# Patient Record
Sex: FEMALE | Race: WHITE | NOT HISPANIC OR LATINO | Employment: OTHER | URBAN - METROPOLITAN AREA
[De-identification: names, ages, dates, MRNs, and addresses within clinical notes are randomized per-mention and may not be internally consistent; named-entity substitution may affect disease eponyms.]

---

## 2023-04-07 LAB
ALANINE AMINOTRANSFERASE (SGPT) (U/L) IN SER/PLAS: 13 U/L (ref 7–45)
ALBUMIN (G/DL) IN SER/PLAS: 4.1 G/DL (ref 3.4–5)
ALKALINE PHOSPHATASE (U/L) IN SER/PLAS: 80 U/L (ref 33–136)
ANION GAP IN SER/PLAS: 11 MMOL/L (ref 10–20)
ASPARTATE AMINOTRANSFERASE (SGOT) (U/L) IN SER/PLAS: 14 U/L (ref 9–39)
BASOPHILS (10*3/UL) IN BLOOD BY AUTOMATED COUNT: 0.07 X10E9/L (ref 0–0.1)
BASOPHILS/100 LEUKOCYTES IN BLOOD BY AUTOMATED COUNT: 1 % (ref 0–2)
BILIRUBIN TOTAL (MG/DL) IN SER/PLAS: 0.5 MG/DL (ref 0–1.2)
CALCIDIOL (25 OH VITAMIN D3) (NG/ML) IN SER/PLAS: 47 NG/ML
CALCIUM (MG/DL) IN SER/PLAS: 10 MG/DL (ref 8.6–10.6)
CARBON DIOXIDE, TOTAL (MMOL/L) IN SER/PLAS: 29 MMOL/L (ref 21–32)
CHLORIDE (MMOL/L) IN SER/PLAS: 106 MMOL/L (ref 98–107)
CHOLESTEROL (MG/DL) IN SER/PLAS: 194 MG/DL (ref 0–199)
CHOLESTEROL IN HDL (MG/DL) IN SER/PLAS: 67.1 MG/DL
CHOLESTEROL/HDL RATIO: 2.9
CREATININE (MG/DL) IN SER/PLAS: 1.25 MG/DL (ref 0.5–1.05)
EOSINOPHILS (10*3/UL) IN BLOOD BY AUTOMATED COUNT: 0.42 X10E9/L (ref 0–0.4)
EOSINOPHILS/100 LEUKOCYTES IN BLOOD BY AUTOMATED COUNT: 5.8 % (ref 0–6)
ERYTHROCYTE DISTRIBUTION WIDTH (RATIO) BY AUTOMATED COUNT: 13.2 % (ref 11.5–14.5)
ERYTHROCYTE MEAN CORPUSCULAR HEMOGLOBIN CONCENTRATION (G/DL) BY AUTOMATED: 31.3 G/DL (ref 32–36)
ERYTHROCYTE MEAN CORPUSCULAR VOLUME (FL) BY AUTOMATED COUNT: 91 FL (ref 80–100)
ERYTHROCYTES (10*6/UL) IN BLOOD BY AUTOMATED COUNT: 4.56 X10E12/L (ref 4–5.2)
GFR FEMALE: 43 ML/MIN/1.73M2
GLUCOSE (MG/DL) IN SER/PLAS: 92 MG/DL (ref 74–99)
HEMATOCRIT (%) IN BLOOD BY AUTOMATED COUNT: 41.6 % (ref 36–46)
HEMOGLOBIN (G/DL) IN BLOOD: 13 G/DL (ref 12–16)
IMMATURE GRANULOCYTES/100 LEUKOCYTES IN BLOOD BY AUTOMATED COUNT: 0.3 % (ref 0–0.9)
LDL: 110 MG/DL (ref 0–99)
LEUKOCYTES (10*3/UL) IN BLOOD BY AUTOMATED COUNT: 7.2 X10E9/L (ref 4.4–11.3)
LYMPHOCYTES (10*3/UL) IN BLOOD BY AUTOMATED COUNT: 2.23 X10E9/L (ref 0.8–3)
LYMPHOCYTES/100 LEUKOCYTES IN BLOOD BY AUTOMATED COUNT: 30.8 % (ref 13–44)
MONOCYTES (10*3/UL) IN BLOOD BY AUTOMATED COUNT: 0.67 X10E9/L (ref 0.05–0.8)
MONOCYTES/100 LEUKOCYTES IN BLOOD BY AUTOMATED COUNT: 9.3 % (ref 2–10)
NEUTROPHILS (10*3/UL) IN BLOOD BY AUTOMATED COUNT: 3.82 X10E9/L (ref 1.6–5.5)
NEUTROPHILS/100 LEUKOCYTES IN BLOOD BY AUTOMATED COUNT: 52.8 % (ref 40–80)
NRBC (PER 100 WBCS) BY AUTOMATED COUNT: 0 /100 WBC (ref 0–0)
PLATELETS (10*3/UL) IN BLOOD AUTOMATED COUNT: 283 X10E9/L (ref 150–450)
POTASSIUM (MMOL/L) IN SER/PLAS: 4.4 MMOL/L (ref 3.5–5.3)
PROTEIN TOTAL: 6.6 G/DL (ref 6.4–8.2)
SODIUM (MMOL/L) IN SER/PLAS: 142 MMOL/L (ref 136–145)
TRIGLYCERIDE (MG/DL) IN SER/PLAS: 86 MG/DL (ref 0–149)
UREA NITROGEN (MG/DL) IN SER/PLAS: 21 MG/DL (ref 6–23)
VLDL: 17 MG/DL (ref 0–40)

## 2023-09-05 ENCOUNTER — OFFICE VISIT (OUTPATIENT)
Dept: PRIMARY CARE | Facility: CLINIC | Age: 83
End: 2023-09-05
Payer: MEDICARE

## 2023-09-05 VITALS
WEIGHT: 146.8 LBS | TEMPERATURE: 97 F | DIASTOLIC BLOOD PRESSURE: 72 MMHG | OXYGEN SATURATION: 93 % | HEART RATE: 78 BPM | BODY MASS INDEX: 27.02 KG/M2 | SYSTOLIC BLOOD PRESSURE: 112 MMHG | HEIGHT: 62 IN

## 2023-09-05 DIAGNOSIS — J40 BRONCHITIS: Primary | ICD-10-CM

## 2023-09-05 PROCEDURE — 1159F MED LIST DOCD IN RCRD: CPT | Performed by: INTERNAL MEDICINE

## 2023-09-05 PROCEDURE — 99213 OFFICE O/P EST LOW 20 MIN: CPT | Performed by: INTERNAL MEDICINE

## 2023-09-05 PROCEDURE — 1157F ADVNC CARE PLAN IN RCRD: CPT | Performed by: INTERNAL MEDICINE

## 2023-09-05 PROCEDURE — 1036F TOBACCO NON-USER: CPT | Performed by: INTERNAL MEDICINE

## 2023-09-05 PROCEDURE — 1160F RVW MEDS BY RX/DR IN RCRD: CPT | Performed by: INTERNAL MEDICINE

## 2023-09-05 RX ORDER — AZITHROMYCIN 250 MG/1
TABLET, FILM COATED ORAL
Qty: 6 TABLET | Refills: 0 | Status: SHIPPED | OUTPATIENT
Start: 2023-09-05 | End: 2023-09-05 | Stop reason: SDUPTHER

## 2023-09-05 RX ORDER — AZITHROMYCIN 250 MG/1
TABLET, FILM COATED ORAL
Qty: 6 TABLET | Refills: 0 | Status: SHIPPED | OUTPATIENT
Start: 2023-09-05 | End: 2023-09-10

## 2023-09-05 ASSESSMENT — ENCOUNTER SYMPTOMS
UNEXPECTED WEIGHT CHANGE: 0
CHILLS: 0
PALPITATIONS: 0
FEVER: 0

## 2023-09-05 NOTE — PROGRESS NOTES
"Subjective   Patient ID: Meri Seth is a 83 y.o. female who presents for Follow-up (Yellow phlegm cough wont go away).    HPI   Patient is presenting with cold symptoms that started 2 weeks sgo ago.  Associated symptoms include  fatigue, postnasal drip,  sore throat, cough.  No chest pain or shortness of breath.  No recent travel.  No exposure to strep or pneumonia or mono.  No associated symptoms of nausea, vomiting, diarrhea.  No skin rash   Tried over-the-counter medications without improvement.  Review of Systems   Constitutional:  Negative for chills, fever and unexpected weight change.   HENT:  Negative for nosebleeds.    Cardiovascular:  Negative for chest pain and palpitations.       Objective   /72   Pulse 78   Temp 36.1 °C (97 °F)   Ht 1.575 m (5' 2\")   Wt 66.6 kg (146 lb 12.8 oz)   SpO2 93%   BMI 26.85 kg/m²     Physical Exam  In general, patient is not in acute distress.  Appears fatigued.  HEENT: No pallor or icterus.  No conjunctival injection, has nosinus tenderness tympanic membrane normal bilaterally.  Throat erythema  seen, no exudate, no enlargement of tonsils.  Neck: No stiffness, no lymphadenopathy.  Chest: Clear, bilateral good air entry.  CVS: S1 and S2 regular, no murmur, no edema.  Abdomen: Soft, nontender, no hepatosplenomegaly.  Skin: No rash  Assessment/Plan   Problem List Items Addressed This Visit       Bronchitis - Primary     Will give Zithromax for bronchitis         Relevant Medications    azithromycin (Zithromax) 250 mg tablet     Call if persisting.     "

## 2023-09-05 NOTE — PROGRESS NOTES
"Subjective   Patient ID: Meri Seth is a 83 y.o. female who presents for Follow-up (Yellow phlegm cough wont go away).    HPI     Review of Systems    Objective   /72   Pulse 78   Temp 36.1 °C (97 °F)   Ht 1.575 m (5' 2\")   Wt 66.6 kg (146 lb 12.8 oz)   SpO2 93%   BMI 26.85 kg/m²     Physical Exam    Assessment/Plan   {Assess/PlanSmartLinks:77333}       "

## 2024-04-17 ENCOUNTER — OFFICE VISIT (OUTPATIENT)
Dept: PRIMARY CARE | Facility: CLINIC | Age: 84
End: 2024-04-17
Payer: MEDICARE

## 2024-04-17 VITALS
SYSTOLIC BLOOD PRESSURE: 140 MMHG | DIASTOLIC BLOOD PRESSURE: 80 MMHG | OXYGEN SATURATION: 95 % | HEART RATE: 71 BPM | WEIGHT: 154 LBS | HEIGHT: 63 IN | BODY MASS INDEX: 27.29 KG/M2 | TEMPERATURE: 98.2 F

## 2024-04-17 DIAGNOSIS — R29.6 RECURRENT FALLS: ICD-10-CM

## 2024-04-17 DIAGNOSIS — H91.93 BILATERAL HEARING LOSS, UNSPECIFIED HEARING LOSS TYPE: ICD-10-CM

## 2024-04-17 DIAGNOSIS — E78.2 MIXED HYPERLIPIDEMIA: ICD-10-CM

## 2024-04-17 DIAGNOSIS — K21.9 GASTROESOPHAGEAL REFLUX DISEASE, UNSPECIFIED WHETHER ESOPHAGITIS PRESENT: ICD-10-CM

## 2024-04-17 DIAGNOSIS — Z23 NEED FOR VACCINATION: ICD-10-CM

## 2024-04-17 DIAGNOSIS — Z00.00 ROUTINE GENERAL MEDICAL EXAMINATION AT HEALTH CARE FACILITY: Primary | ICD-10-CM

## 2024-04-17 DIAGNOSIS — N18.31 STAGE 3A CHRONIC KIDNEY DISEASE (MULTI): ICD-10-CM

## 2024-04-17 PROBLEM — J40 BRONCHITIS: Status: RESOLVED | Noted: 2023-09-05 | Resolved: 2024-04-17

## 2024-04-17 PROBLEM — E78.5 HYPERLIPIDEMIA: Status: ACTIVE | Noted: 2024-04-17

## 2024-04-17 PROCEDURE — 1160F RVW MEDS BY RX/DR IN RCRD: CPT | Performed by: INTERNAL MEDICINE

## 2024-04-17 PROCEDURE — 1159F MED LIST DOCD IN RCRD: CPT | Performed by: INTERNAL MEDICINE

## 2024-04-17 PROCEDURE — G0439 PPPS, SUBSEQ VISIT: HCPCS | Performed by: INTERNAL MEDICINE

## 2024-04-17 PROCEDURE — 1123F ACP DISCUSS/DSCN MKR DOCD: CPT | Performed by: INTERNAL MEDICINE

## 2024-04-17 PROCEDURE — 90677 PCV20 VACCINE IM: CPT | Performed by: INTERNAL MEDICINE

## 2024-04-17 PROCEDURE — 1170F FXNL STATUS ASSESSED: CPT | Performed by: INTERNAL MEDICINE

## 2024-04-17 PROCEDURE — 1036F TOBACCO NON-USER: CPT | Performed by: INTERNAL MEDICINE

## 2024-04-17 PROCEDURE — 1157F ADVNC CARE PLAN IN RCRD: CPT | Performed by: INTERNAL MEDICINE

## 2024-04-17 PROCEDURE — 1158F ADVNC CARE PLAN TLK DOCD: CPT | Performed by: INTERNAL MEDICINE

## 2024-04-17 PROCEDURE — G0009 ADMIN PNEUMOCOCCAL VACCINE: HCPCS | Performed by: INTERNAL MEDICINE

## 2024-04-17 PROCEDURE — 99213 OFFICE O/P EST LOW 20 MIN: CPT | Performed by: INTERNAL MEDICINE

## 2024-04-17 RX ORDER — PANTOPRAZOLE SODIUM 40 MG/1
40 TABLET, DELAYED RELEASE ORAL DAILY
Qty: 30 TABLET | Refills: 1 | Status: SHIPPED | OUTPATIENT
Start: 2024-04-17 | End: 2024-05-21 | Stop reason: ALTCHOICE

## 2024-04-17 RX ORDER — LOVASTATIN 20 MG/1
TABLET ORAL
COMMUNITY
Start: 2023-12-28

## 2024-04-17 ASSESSMENT — SOCIAL DETERMINANTS OF HEALTH (SDOH): HOW HARD IS IT FOR YOU TO PAY FOR THE VERY BASICS LIKE FOOD, HOUSING, MEDICAL CARE, AND HEATING?: NOT HARD AT ALL

## 2024-04-17 ASSESSMENT — ENCOUNTER SYMPTOMS
LOSS OF SENSATION IN FEET: 0
OCCASIONAL FEELINGS OF UNSTEADINESS: 1
DEPRESSION: 0

## 2024-04-17 ASSESSMENT — ACTIVITIES OF DAILY LIVING (ADL)
MANAGING_FINANCES: INDEPENDENT
BATHING: INDEPENDENT
TAKING_MEDICATION: INDEPENDENT
DOING_HOUSEWORK: INDEPENDENT
TAKING_MEDICATION: INDEPENDENT
DRESSING: INDEPENDENT
GROCERY_SHOPPING: INDEPENDENT
DOING_HOUSEWORK: INDEPENDENT
BATHING: INDEPENDENT
MANAGING_FINANCES: INDEPENDENT
GROCERY_SHOPPING: INDEPENDENT

## 2024-04-17 ASSESSMENT — PATIENT HEALTH QUESTIONNAIRE - PHQ9
1. LITTLE INTEREST OR PLEASURE IN DOING THINGS: NOT AT ALL
SUM OF ALL RESPONSES TO PHQ9 QUESTIONS 1 & 2: 0
1. LITTLE INTEREST OR PLEASURE IN DOING THINGS: NOT AT ALL
2. FEELING DOWN, DEPRESSED OR HOPELESS: NOT AT ALL
2. FEELING DOWN, DEPRESSED OR HOPELESS: NOT AT ALL
SUM OF ALL RESPONSES TO PHQ9 QUESTIONS 1 AND 2: 0

## 2024-04-17 NOTE — PROGRESS NOTES
"Subjective   Reason for Visit: Meri Seth is an 83 y.o. female here for a Medicare Wellness visit.     Past Medical, Surgical, and Family History reviewed and updated in chart.    Reviewed all medications by prescribing practitioner or clinical pharmacist (such as prescriptions, OTCs, herbal therapies and supplements) and documented in the medical record.    HPI  Has had 3 falls in December.  First time felt lightheaded and off balance and follow-up.  No major injury    Fell on step and hit left buttock.  Had a bruise which got better.  No fracture  Once fell hiking.  Does have some unsteady feeling.  On and off feels lightheaded  Does not use cane.  Hold onto furniture sometimes to walk  No vision problem  Hearing is reduced  Continues to have some hoarseness.  Does have bloating  Has intermittent cough and sneezing    Does not check BP at home  Diet has a lot of cheese.  Watches salt intake    Taking statin without side effects  Takes calcium and vitamin D  Patient Care Team:  Sneha hWitten MD as PCP - General  Sneha Whitten MD as PCP - [employer] PCP     Review of Systems  No fever chills or night sweats  No nausea vomiting constipation or diarrhea  No chest pain or shortness of breath or palpitation  No PND orthopnea  No rectal bleed  No anxiety or depression  Objective   Vitals:  /80   Pulse 71   Temp 36.8 °C (98.2 °F)   Ht 1.588 m (5' 2.5\")   Wt 69.9 kg (154 lb)   SpO2 95%   BMI 27.72 kg/m²       Physical Exam  Not in acute distress  Able to get up from chair without holding.  Pupils PERRLA.  Tympanic membrane normal bilateral.  Slight cerumen seen  Neck supple no lymphadenopathy JVD or goiter  Chest is clear to auscultation  CVS: S1-S2 regular in rate and rhythm no murmur no edema  Abdomen: Soft has gas distention.  Mild epigastric discomfort on palpation.  Bowel sounds heard.  No mass felt  Neuroexam no focal findings  Gait is slow and cautious.  Strength " normal  Assessment/Plan   Problem List Items Addressed This Visit       Stage 3a chronic kidney disease (Multi)    Hyperlipidemia    Relevant Orders    CBC and Auto Differential    Comprehensive Metabolic Panel    Lipid Panel    Recurrent falls    Relevant Orders    Referral to Falls Clinic    Referral to Physical Therapy    Gastroesophageal reflux disease    Relevant Medications    pantoprazole (ProtoNix) 40 mg EC tablet    Routine general medical examination at health care facility - Primary     Other Visit Diagnoses       Need for vaccination        Relevant Orders    Pneumococcal conjugate vaccine 20-valent IM (Completed)    Bilateral hearing loss, unspecified hearing loss type        Relevant Orders    Referral to Audiology          Wellness exam and counseling completed  She declines further mammogram  Has had multiple falls.  Has slight unsteadiness.  Will refer to falls clinic  Audiology referral given  Has abdominal gas and cough.  Will give trial of PPI.  Avoid cheese and breads for some time  Can take magnesium 200 mg daily  Fall prevention discussed  Check renal function  Check lipids and liver enzymes  Follow-up in 4 weeks  Blood pressure slightly high.  Will reassess  Get Tdap and Shingrix from pharmacy   Patient was identified as a fall risk. Risk prevention instructions provided.

## 2024-04-17 NOTE — PATIENT INSTRUCTIONS
It was a pleasure meeting you today.   You can see your health information, review clinical summaries from office visits & test results online when you follow your health with MY  Chart, a personal health record. To sign up go to [email or web address]. If you need assistance with signing up or trouble getting into your account call [facility] Patient Line 24/7 at [phone].   If there are any  follow-up questions you can always call the office at [phone].  Thank you for choosing [facility] for your healthcare needs.  Make diet changes as discussed.  Avoid breads and cheeses and fermented foods for a while  Take magnesium citrate or glycinate 200 mg daily  Follow-up in 4 weeks.      Ways to Help Prevent Falls at Home    Quick Tips   ? Ask for help if you need it. Most people want to help!   ? Get up slowly after sitting or laying down   ? Wear a medical alert device or keep cell phone in your pocket   ? Use night lights, especially areas near a bathroom   ? Keep the items you use often within reach on a small stool or end table   ? Use an assistive device such as walker or cane, as directed by provider/physical therapy   ? Use a non-slip mat and grab bars in your bathroom. Look for home health sections for best options     Other Areas to Focus On   ? Exercise and nutrition: Regular exercise or taking a falls prevention class are great ways improve strength and balance. Don’t forget to stay hydrated and bring a snack!   ? Medicine side effects: Some medicines can make you sleepy or dizzy, which could cause a fall. Ask your healthcare provider about the side effects your medicines could cause. Be sure to let them know if you take any vitamins or supplements as well.   ? Tripping hazards: Remove items you could trip on, such as loose mats, rugs, cords, and clutter. Wear closed toe shoes with rubber soles.   ? Health and wellness: Get regular checkups with your healthcare provider, plus routine  vision and hearing screenings. Talk with your healthcare provider about:   o Your medicines and the possible side effects - bring them in a bag if that is easier!   o Problems with balance or feeling dizzy   o Ways to promote bone health, such as Vitamin D and calcium supplements   o Questions or concerns about falling     *Ask your healthcare team if you have questions     [facility], 2022

## 2024-04-26 ENCOUNTER — LAB (OUTPATIENT)
Dept: LAB | Facility: LAB | Age: 84
End: 2024-04-26
Payer: MEDICARE

## 2024-04-26 DIAGNOSIS — N18.32 STAGE 3B CHRONIC KIDNEY DISEASE (MULTI): Primary | ICD-10-CM

## 2024-04-26 DIAGNOSIS — E78.2 MIXED HYPERLIPIDEMIA: ICD-10-CM

## 2024-04-26 LAB
ALBUMIN SERPL BCP-MCNC: 4.1 G/DL (ref 3.4–5)
ALP SERPL-CCNC: 80 U/L (ref 33–136)
ALT SERPL W P-5'-P-CCNC: 13 U/L (ref 7–45)
ANION GAP SERPL CALC-SCNC: 11 MMOL/L (ref 10–20)
AST SERPL W P-5'-P-CCNC: 17 U/L (ref 9–39)
BASOPHILS # BLD AUTO: 0.06 X10*3/UL (ref 0–0.1)
BASOPHILS NFR BLD AUTO: 0.8 %
BILIRUB SERPL-MCNC: 0.4 MG/DL (ref 0–1.2)
BUN SERPL-MCNC: 21 MG/DL (ref 6–23)
CALCIUM SERPL-MCNC: 9.6 MG/DL (ref 8.6–10.6)
CHLORIDE SERPL-SCNC: 106 MMOL/L (ref 98–107)
CHOLEST SERPL-MCNC: 182 MG/DL (ref 0–199)
CHOLESTEROL/HDL RATIO: 2.5
CO2 SERPL-SCNC: 30 MMOL/L (ref 21–32)
CREAT SERPL-MCNC: 1.35 MG/DL (ref 0.5–1.05)
EGFRCR SERPLBLD CKD-EPI 2021: 39 ML/MIN/1.73M*2
EOSINOPHIL # BLD AUTO: 0.36 X10*3/UL (ref 0–0.4)
EOSINOPHIL NFR BLD AUTO: 4.7 %
ERYTHROCYTE [DISTWIDTH] IN BLOOD BY AUTOMATED COUNT: 13.2 % (ref 11.5–14.5)
GLUCOSE SERPL-MCNC: 84 MG/DL (ref 74–99)
HCT VFR BLD AUTO: 39.5 % (ref 36–46)
HDLC SERPL-MCNC: 72.4 MG/DL
HGB BLD-MCNC: 12.7 G/DL (ref 12–16)
IMM GRANULOCYTES # BLD AUTO: 0.01 X10*3/UL (ref 0–0.5)
IMM GRANULOCYTES NFR BLD AUTO: 0.1 % (ref 0–0.9)
LDLC SERPL CALC-MCNC: 96 MG/DL
LYMPHOCYTES # BLD AUTO: 2.36 X10*3/UL (ref 0.8–3)
LYMPHOCYTES NFR BLD AUTO: 30.9 %
MCH RBC QN AUTO: 29.4 PG (ref 26–34)
MCHC RBC AUTO-ENTMCNC: 32.2 G/DL (ref 32–36)
MCV RBC AUTO: 91 FL (ref 80–100)
MONOCYTES # BLD AUTO: 0.87 X10*3/UL (ref 0.05–0.8)
MONOCYTES NFR BLD AUTO: 11.4 %
NEUTROPHILS # BLD AUTO: 3.97 X10*3/UL (ref 1.6–5.5)
NEUTROPHILS NFR BLD AUTO: 52.1 %
NON HDL CHOLESTEROL: 110 MG/DL (ref 0–149)
NRBC BLD-RTO: 0 /100 WBCS (ref 0–0)
PLATELET # BLD AUTO: 249 X10*3/UL (ref 150–450)
POTASSIUM SERPL-SCNC: 4.2 MMOL/L (ref 3.5–5.3)
PROT SERPL-MCNC: 6.5 G/DL (ref 6.4–8.2)
RBC # BLD AUTO: 4.32 X10*6/UL (ref 4–5.2)
SODIUM SERPL-SCNC: 143 MMOL/L (ref 136–145)
TRIGL SERPL-MCNC: 67 MG/DL (ref 0–149)
VLDL: 13 MG/DL (ref 0–40)
WBC # BLD AUTO: 7.6 X10*3/UL (ref 4.4–11.3)

## 2024-04-26 PROCEDURE — 80061 LIPID PANEL: CPT

## 2024-04-26 PROCEDURE — 80053 COMPREHEN METABOLIC PANEL: CPT

## 2024-04-26 PROCEDURE — 85025 COMPLETE CBC W/AUTO DIFF WBC: CPT

## 2024-04-26 PROCEDURE — 36415 COLL VENOUS BLD VENIPUNCTURE: CPT

## 2024-04-26 NOTE — RESULT ENCOUNTER NOTE
Reviewed labs.Kidney function lower than nrmal.will get and ultrasound of kidneys.please schedule.order is in chart.other labs ok

## 2024-05-02 ENCOUNTER — TELEPHONE (OUTPATIENT)
Dept: PRIMARY CARE | Facility: CLINIC | Age: 84
End: 2024-05-02
Payer: MEDICARE

## 2024-05-02 NOTE — TELEPHONE ENCOUNTER
Reviewed labs.Kidney function lower than nrmal.will get and ultrasound of kidneys.please schedule.order is in chart.other labs ok     I left pt a VM to return call for results.

## 2024-05-06 ENCOUNTER — HOSPITAL ENCOUNTER (OUTPATIENT)
Dept: RADIOLOGY | Facility: CLINIC | Age: 84
Discharge: HOME | End: 2024-05-06
Payer: MEDICARE

## 2024-05-06 DIAGNOSIS — N18.32 STAGE 3B CHRONIC KIDNEY DISEASE (MULTI): ICD-10-CM

## 2024-05-06 PROCEDURE — 76770 US EXAM ABDO BACK WALL COMP: CPT | Performed by: RADIOLOGY

## 2024-05-06 PROCEDURE — 76770 US EXAM ABDO BACK WALL COMP: CPT

## 2024-05-09 ENCOUNTER — TELEPHONE (OUTPATIENT)
Dept: PRIMARY CARE | Facility: CLINIC | Age: 84
End: 2024-05-09
Payer: MEDICARE

## 2024-05-09 NOTE — TELEPHONE ENCOUNTER
Result Communication  Results discussed with patient.  Ultrasound suggest medical renal disease.  May need to consider blood pressure medication.  Will do a urine test at follow-up.  Decide on CT scan to evaluate for possible hydronephrosis.  Recommended to see me in a couple of weeks    Resulted Orders   US renal complete    Narrative    Interpreted By:  Tanvir Levi,   STUDY:  US RENAL COMPLETE;  5/6/2024 5:00 pm      INDICATION:  Signs/Symptoms:reduced renal function.      COMPARISON:  None.      ACCESSION NUMBER(S):  [id number]      ORDERING CLINICIAN:  EVER DEJESUS      TECHNIQUE:  Multiple images of the kidneys were obtained  .      FINDINGS:  Right kidney measures 9.7 cm. Slight right hydronephrosis versus  extrarenal pelvis. Cortical thinning and increased echogenicity  suggests medical renal disease      Left kidney measures 9.8 cm. Mildly hyperechoic kidney suggest  medical renal disease.      Urinary bladder appears otherwise unremarkable. Bowel gas does limit  sensitivity        Impression    Cortical thinning and increased renal echogenicity suggests medical  renal disease. Slight prominence of the right renal collecting system  which may reflect slight hydronephrosis versus extrarenal pelvis. CT  would better interrogate      MACRO:  None      Signed by: Tanvir Levi 5/7/2024 6:04 PM  Dictation workstation:   [id number]       11:48 AM      Results were successfully communicated with the patient and they acknowledged their understanding.

## 2024-05-21 ENCOUNTER — OFFICE VISIT (OUTPATIENT)
Dept: PRIMARY CARE | Facility: CLINIC | Age: 84
End: 2024-05-21
Payer: MEDICARE

## 2024-05-21 ENCOUNTER — LAB (OUTPATIENT)
Dept: LAB | Facility: LAB | Age: 84
End: 2024-05-21
Payer: MEDICARE

## 2024-05-21 VITALS
BODY MASS INDEX: 27.16 KG/M2 | SYSTOLIC BLOOD PRESSURE: 150 MMHG | OXYGEN SATURATION: 92 % | WEIGHT: 153.3 LBS | DIASTOLIC BLOOD PRESSURE: 88 MMHG | HEART RATE: 70 BPM | HEIGHT: 63 IN

## 2024-05-21 DIAGNOSIS — K21.9 GASTROESOPHAGEAL REFLUX DISEASE, UNSPECIFIED WHETHER ESOPHAGITIS PRESENT: ICD-10-CM

## 2024-05-21 DIAGNOSIS — I10 ESSENTIAL HYPERTENSION: Primary | ICD-10-CM

## 2024-05-21 DIAGNOSIS — R93.429 ABNORMAL ULTRASOUND OF KIDNEY: ICD-10-CM

## 2024-05-21 DIAGNOSIS — N18.31 STAGE 3A CHRONIC KIDNEY DISEASE (MULTI): ICD-10-CM

## 2024-05-21 LAB
RBC #/AREA URNS AUTO: ABNORMAL /HPF
SQUAMOUS #/AREA URNS AUTO: ABNORMAL /HPF
WBC #/AREA URNS AUTO: ABNORMAL /HPF

## 2024-05-21 PROCEDURE — 1036F TOBACCO NON-USER: CPT | Performed by: INTERNAL MEDICINE

## 2024-05-21 PROCEDURE — 3077F SYST BP >= 140 MM HG: CPT | Performed by: INTERNAL MEDICINE

## 2024-05-21 PROCEDURE — 1157F ADVNC CARE PLAN IN RCRD: CPT | Performed by: INTERNAL MEDICINE

## 2024-05-21 PROCEDURE — 1160F RVW MEDS BY RX/DR IN RCRD: CPT | Performed by: INTERNAL MEDICINE

## 2024-05-21 PROCEDURE — 3079F DIAST BP 80-89 MM HG: CPT | Performed by: INTERNAL MEDICINE

## 2024-05-21 PROCEDURE — 1159F MED LIST DOCD IN RCRD: CPT | Performed by: INTERNAL MEDICINE

## 2024-05-21 PROCEDURE — 1123F ACP DISCUSS/DSCN MKR DOCD: CPT | Performed by: INTERNAL MEDICINE

## 2024-05-21 PROCEDURE — 99214 OFFICE O/P EST MOD 30 MIN: CPT | Performed by: INTERNAL MEDICINE

## 2024-05-21 PROCEDURE — 81001 URINALYSIS AUTO W/SCOPE: CPT

## 2024-05-21 PROCEDURE — G2211 COMPLEX E/M VISIT ADD ON: HCPCS | Performed by: INTERNAL MEDICINE

## 2024-05-21 RX ORDER — PANTOPRAZOLE SODIUM 20 MG/1
20 TABLET, DELAYED RELEASE ORAL
Qty: 30 TABLET | Refills: 11 | Status: SHIPPED | OUTPATIENT
Start: 2024-05-21 | End: 2025-05-21

## 2024-05-21 RX ORDER — AMLODIPINE BESYLATE 2.5 MG/1
2.5 TABLET ORAL DAILY
Qty: 30 TABLET | Refills: 5 | Status: SHIPPED | OUTPATIENT
Start: 2024-05-21 | End: 2024-11-17

## 2024-05-21 RX ORDER — IPRATROPIUM BROMIDE 21 UG/1
2 SPRAY, METERED NASAL EVERY 12 HOURS
Qty: 30 ML | Refills: 0 | Status: SHIPPED | OUTPATIENT
Start: 2024-05-21 | End: 2025-05-21

## 2024-05-21 ASSESSMENT — PATIENT HEALTH QUESTIONNAIRE - PHQ9
2. FEELING DOWN, DEPRESSED OR HOPELESS: NOT AT ALL
SUM OF ALL RESPONSES TO PHQ9 QUESTIONS 1 AND 2: 0
1. LITTLE INTEREST OR PLEASURE IN DOING THINGS: NOT AT ALL

## 2024-05-21 NOTE — PROGRESS NOTES
"Subjective   Patient ID: Meri Seth is a 84 y.o. female who presents for Follow-up.    HPI   Patient is seen for follow-up of cough and blood pressure and discuss test results  Blood pressures at home is 1 40-1 47 systolic.  Never this hide  No headache or dizziness  Cough was somewhat better with Protonix.  Symptoms have recurred.  Still has bloating  Completed renal ultrasound.  Shows possible hydro nephrosis and medical renal disease  No further falls  No rectal bleed  No weight loss or night sweats  Review of Systems  No fever or chills  No nausea or vomiting  No PND or orthopnea  No leg edema  No anxiety or depression  Objective   /88   Pulse 70   Ht 1.588 m (5' 2.5\")   Wt 69.5 kg (153 lb 4.8 oz)   SpO2 92%   BMI 27.59 kg/m²     Physical Exam  Not in acute distress  Able to get up from chair without holding.  Neck supple no lymphadenopathy JVD or goiter  Chest is clear to auscultation  CVS: S1-S2 regular in rate and rhythm no murmur no edema  Abdomen: Soft has gas distention.  No epigastric discomfort on palpation.  Bowel sounds heard.  No mass felt    Assessment/Plan   Problem List Items Addressed This Visit             ICD-10-CM    Stage 3a chronic kidney disease (Multi) N18.31    Relevant Orders    Microscopic Only, Urine (Completed)    CT kidney wo IV contrast    Gastroesophageal reflux disease K21.9    Relevant Medications    pantoprazole (Protonix) 20 mg EC tablet    ipratropium (Atrovent) 21 mcg (0.03 %) nasal spray    Essential hypertension - Primary I10    Relevant Medications    amLODIPine (Norvasc) 2.5 mg tablet     Other Visit Diagnoses         Codes    Abnormal ultrasound of kidney     R93.429    Relevant Orders    CT kidney wo IV contrast          Since blood pressure remains high we will start low-dose medication  Will monitor kidney function  Check urinalysis  Check CT of kidneys since ultrasound came back with abnormal findings  And she also has  Restart PPI.  Will add nasal " spray  Can take lovastatin every other day  Avoid dairy products and gluten for some time to see if bloating and acid reflux improves  Has appointment with falls clinic in June  Continue strengthening exercises  Follow-up in 4 to 6 weeks

## 2024-06-07 ENCOUNTER — HOSPITAL ENCOUNTER (OUTPATIENT)
Dept: RADIOLOGY | Facility: CLINIC | Age: 84
Discharge: HOME | End: 2024-06-07
Payer: MEDICARE

## 2024-06-07 DIAGNOSIS — N18.31 STAGE 3A CHRONIC KIDNEY DISEASE (MULTI): ICD-10-CM

## 2024-06-07 DIAGNOSIS — R93.429 ABNORMAL ULTRASOUND OF KIDNEY: ICD-10-CM

## 2024-06-07 PROCEDURE — 74150 CT ABDOMEN W/O CONTRAST: CPT

## 2024-06-10 NOTE — RESULT ENCOUNTER NOTE
CT of the kidney looks okay.  Has a lot of stool in the colon.  If you did not have any bowel movement since  the CT ,make sure to take laxatives

## 2024-06-17 PROBLEM — M85.80 OSTEOPENIA: Status: ACTIVE | Noted: 2024-06-17

## 2024-06-17 PROBLEM — B35.1 ONYCHOMYCOSIS: Status: ACTIVE | Noted: 2024-06-17

## 2024-06-17 PROBLEM — R05.9 COUGH: Status: ACTIVE | Noted: 2024-06-17

## 2024-06-17 PROBLEM — T63.441A BEE STING: Status: ACTIVE | Noted: 2024-06-17

## 2024-06-17 PROBLEM — M75.80 ROTATOR CUFF TENDINITIS: Status: ACTIVE | Noted: 2024-06-17

## 2024-06-17 PROBLEM — M79.621 PAIN OF RIGHT UPPER ARM: Status: ACTIVE | Noted: 2024-06-17

## 2024-06-17 PROBLEM — Z12.31 ENCOUNTER FOR SCREENING MAMMOGRAM FOR MALIGNANT NEOPLASM OF BREAST: Status: ACTIVE | Noted: 2024-04-17

## 2024-06-17 PROBLEM — E66.9 OBESITY: Status: ACTIVE | Noted: 2024-06-17

## 2024-06-18 ENCOUNTER — DOCUMENTATION (OUTPATIENT)
Dept: PHYSICAL THERAPY | Facility: CLINIC | Age: 84
End: 2024-06-18
Payer: MEDICARE

## 2024-06-18 NOTE — PROGRESS NOTES
Physical Therapy Falls Clinic Documentation      Meri Seth was seen today for a physical therapy balance screen as part of a multidisciplinary assessment at the falls clinic at the request of Dr. Sneha Whitten (referring provider). The patient was agreeable to participate.   Red Flags:  -Have you fallen? Yes   -Do you feel unsteady? sometimes  -Do you worry about falling? No    Dizziness: (NO); Diplopia: (NO); Dysarthria: (NO); Dysphagia: (NO); Drop attacks: (NO)     Fall Hx:  Most recent fall in Dec 2023; tripped in bathroom, tripped when hiking, and walking up stairs at friend's house. No falls this year.   No dizziness with falls. Feels her balance is good overall but not as good as it use to be. Does not use assistive device.       Home Set Up: lives in home  Stairs to enter home:  no Rail present? NA  Stairs within home:   yes  Rail present? yes  IND with ADL's and IADL's: YES  Assistance required with:  nothing  Driving: YES  Family support: lives with spouse  Tub shower: yes   AD/Equipment: grab bars in shower/tub       Outcome Measures:     - Timed Up & Go (TUG): 9 seconds no device  An older adult who takes >= 12 sec to complete the TUG is at risk for falling.       - Tandem Stand: 10   seconds  - Single leg stand: unable  An older adult who cannot hold the tandem stand for at least 10 seconds is at increased risk of falling.      - Modified Clinical Test of Sensory Interaction in Balance   Eyes open, firm surface:  30  sec  Eyes closed, firm surface: 30  sec  Eyes open, foam:  30 sec  Eyes closed, foam:  3  sec  Total: 93/120 sec   Failure to maintain balance with eye closed on firm surface indicates visually dependent. Failure to maintain balance on foam surface indicate that visual and/or vestibular system is not be being used to maintain balance.       - 5x Sit to Stand: 10 seconds no UE's  Community dwelling older adults Cutoff scores:  =12 seconds identifies the need to further assess for  falls  >15 seconds= risk of fall    Based on their performance on the above outcome measures, she is considered minimal to no risk for falls. Currently, formal PT treatment does not appear to be needed based upon her current scores and functional performance and she declined further formal evaluation from PT at this time. She has not had any falls this year either. Per pt request, 2 balance exercises were provided to her that she could safely perform at home and these were practiced in the clinic with the patient. She demonstrated safe performance and good understanding of exercises. These include modified 1/4 tandem stance with eyes open near counter and modified NBOS with eyes closed near counter. Handouts with instructions were provided to patient. Patient was provided with  Rehab Brochure and instructed to follow up with this physical therapist as needed. She voiced agreement and satisfaction with this plan.     Perla Chapin PT, DPT, NCS

## 2024-06-19 ENCOUNTER — TELEMEDICINE (OUTPATIENT)
Dept: PHARMACY | Facility: HOSPITAL | Age: 84
End: 2024-06-19
Payer: MEDICARE

## 2024-06-19 ENCOUNTER — OFFICE VISIT (OUTPATIENT)
Dept: OTOLARYNGOLOGY | Facility: CLINIC | Age: 84
End: 2024-06-19
Payer: MEDICARE

## 2024-06-19 ENCOUNTER — OFFICE VISIT (OUTPATIENT)
Dept: GERIATRIC MEDICINE | Facility: CLINIC | Age: 84
End: 2024-06-19
Payer: MEDICARE

## 2024-06-19 VITALS
HEART RATE: 80 BPM | DIASTOLIC BLOOD PRESSURE: 81 MMHG | BODY MASS INDEX: 27.29 KG/M2 | WEIGHT: 154 LBS | HEIGHT: 63 IN | TEMPERATURE: 98 F | SYSTOLIC BLOOD PRESSURE: 123 MMHG

## 2024-06-19 DIAGNOSIS — Z91.81 AT RISK FOR FALLING: Primary | ICD-10-CM

## 2024-06-19 DIAGNOSIS — M85.80 OSTEOPENIA, UNSPECIFIED LOCATION: ICD-10-CM

## 2024-06-19 DIAGNOSIS — K21.9 GASTROESOPHAGEAL REFLUX DISEASE, UNSPECIFIED WHETHER ESOPHAGITIS PRESENT: ICD-10-CM

## 2024-06-19 DIAGNOSIS — I10 ESSENTIAL HYPERTENSION: ICD-10-CM

## 2024-06-19 DIAGNOSIS — R29.6 RECURRENT FALLS: ICD-10-CM

## 2024-06-19 DIAGNOSIS — B35.1 ONYCHOMYCOSIS: ICD-10-CM

## 2024-06-19 DIAGNOSIS — H93.A2 PULSATILE TINNITUS OF LEFT EAR: Primary | ICD-10-CM

## 2024-06-19 DIAGNOSIS — Z91.81 AT RISK FOR FALLING: ICD-10-CM

## 2024-06-19 DIAGNOSIS — R29.6 RECURRENT FALLS: Primary | ICD-10-CM

## 2024-06-19 PROCEDURE — 99214 OFFICE O/P EST MOD 30 MIN: CPT | Performed by: NURSE PRACTITIONER

## 2024-06-19 PROCEDURE — 3079F DIAST BP 80-89 MM HG: CPT | Performed by: NURSE PRACTITIONER

## 2024-06-19 PROCEDURE — 99204 OFFICE O/P NEW MOD 45 MIN: CPT | Performed by: NURSE PRACTITIONER

## 2024-06-19 PROCEDURE — 1157F ADVNC CARE PLAN IN RCRD: CPT | Performed by: INTERNAL MEDICINE

## 2024-06-19 PROCEDURE — 99215 OFFICE O/P EST HI 40 MIN: CPT | Performed by: INTERNAL MEDICINE

## 2024-06-19 PROCEDURE — 1126F AMNT PAIN NOTED NONE PRSNT: CPT | Performed by: NURSE PRACTITIONER

## 2024-06-19 PROCEDURE — 1123F ACP DISCUSS/DSCN MKR DOCD: CPT | Performed by: NURSE PRACTITIONER

## 2024-06-19 PROCEDURE — 1159F MED LIST DOCD IN RCRD: CPT | Performed by: NURSE PRACTITIONER

## 2024-06-19 PROCEDURE — 3074F SYST BP LT 130 MM HG: CPT | Performed by: NURSE PRACTITIONER

## 2024-06-19 PROCEDURE — 1036F TOBACCO NON-USER: CPT | Performed by: NURSE PRACTITIONER

## 2024-06-19 PROCEDURE — 1123F ACP DISCUSS/DSCN MKR DOCD: CPT | Performed by: INTERNAL MEDICINE

## 2024-06-19 PROCEDURE — 1157F ADVNC CARE PLAN IN RCRD: CPT | Performed by: NURSE PRACTITIONER

## 2024-06-19 PROCEDURE — 1036F TOBACCO NON-USER: CPT | Performed by: INTERNAL MEDICINE

## 2024-06-19 RX ORDER — PANTOPRAZOLE SODIUM 20 MG/1
20 TABLET, DELAYED RELEASE ORAL DAILY PRN
Qty: 30 TABLET | Refills: 0 | Status: SHIPPED | OUTPATIENT
Start: 2024-06-19 | End: 2024-06-20 | Stop reason: ALTCHOICE

## 2024-06-19 RX ORDER — IPRATROPIUM BROMIDE 21 UG/1
2 SPRAY, METERED NASAL EVERY 12 HOURS PRN
Qty: 30 ML | Refills: 0 | Status: SHIPPED | OUTPATIENT
Start: 2024-06-19 | End: 2025-06-19

## 2024-06-19 RX ORDER — CALCIUM CARBONATE 300MG(750)
200 TABLET,CHEWABLE ORAL DAILY
COMMUNITY

## 2024-06-19 SDOH — ECONOMIC STABILITY: FOOD INSECURITY: WITHIN THE PAST 12 MONTHS, YOU WORRIED THAT YOUR FOOD WOULD RUN OUT BEFORE YOU GOT MONEY TO BUY MORE.: NEVER TRUE

## 2024-06-19 SDOH — ECONOMIC STABILITY: FOOD INSECURITY: WITHIN THE PAST 12 MONTHS, THE FOOD YOU BOUGHT JUST DIDN'T LAST AND YOU DIDN'T HAVE MONEY TO GET MORE.: NEVER TRUE

## 2024-06-19 ASSESSMENT — LIFESTYLE VARIABLES
HOW OFTEN DO YOU HAVE SIX OR MORE DRINKS ON ONE OCCASION: NEVER
AUDIT-C TOTAL SCORE: 0
HOW MANY STANDARD DRINKS CONTAINING ALCOHOL DO YOU HAVE ON A TYPICAL DAY: PATIENT DOES NOT DRINK
HOW OFTEN DO YOU HAVE A DRINK CONTAINING ALCOHOL: NEVER
SKIP TO QUESTIONS 9-10: 1

## 2024-06-19 ASSESSMENT — COLUMBIA-SUICIDE SEVERITY RATING SCALE - C-SSRS
2. HAVE YOU ACTUALLY HAD ANY THOUGHTS OF KILLING YOURSELF?: NO
1. IN THE PAST MONTH, HAVE YOU WISHED YOU WERE DEAD OR WISHED YOU COULD GO TO SLEEP AND NOT WAKE UP?: NO
6. HAVE YOU EVER DONE ANYTHING, STARTED TO DO ANYTHING, OR PREPARED TO DO ANYTHING TO END YOUR LIFE?: NO

## 2024-06-19 ASSESSMENT — ENCOUNTER SYMPTOMS
LOSS OF SENSATION IN FEET: 0
OCCASIONAL FEELINGS OF UNSTEADINESS: 0
DEPRESSION: 0

## 2024-06-19 ASSESSMENT — PAIN SCALES - GENERAL: PAINLEVEL: 0-NO PAIN

## 2024-06-19 NOTE — PROGRESS NOTES
Chief Complaint   Patient presents with    Fall    balance impairment       Subjective   Ms. Seth 84 y.o. year old female is accompanied by: alone  Consult Requested By: patient's primary care physician, Sneha Whitten MD   Reason for consultation or primary concern: Fall and balance impairment    Ms. Seth 84 y.o. year old female with a history of HTN, HL, CKD, GERD, falls, who presents today for the multidisciplinary falls clinic evaluation.     HPI     Fall History:   How many falls have you had in the last 6 months? 1  Description of fall events:   - has had 3 falls. Were all in 2023. Near end of year  - 1 in bathroom (was standing on one foot trying to cut her toe nails and lost balance and fell), 1 on hiking trail (was walking really fast and ran into a rock and fell forward on her face. Broke her glasses), and 1 on stairs (at friend's house. Was carrying bag. Lost balance and fell back)  - they were fluky  - no dizziness or lightheadedness with these falls. They happened 1-2 weeks apart  - no lightheadedness when stands up. She doesn't  - almost gets a dizzy sensation. Like if walking or hiking and really hot. It feel like it will happen for a sec but doesn't   - there were no changes with meds or anything to explain those falls  - last fall 12/2023  - not afraid of faling  - dressing and doing yard work have led to falls  - usually wearing shoes when falls. And wears shoes around house.   - most falls are during daytime.   - occasionally stumbles/trips. Rarely loses balance.   - has had bruises from falls. No ER visits  - able to get up from a fall without help. Not using device because of falls.  - no balance impairment   - no vertigo, dizziness  - no vision issues  - no hearing loss  - occasional pulsatile tinnitus. But it is brief  - had hearing test last month. Was fine.   - no headaches  - no history of diabetes or neuropathy  - no arthritis    Other relevant history:   - checks bp at home. Not  daily. Usually 120s-130s systolic. Usually checks mid morning. This is before taking medicine.   - started on amlodipine in may  - didn;t take her meds yet today. Normally takes meds   - doesn't have heart burn. Not taking pantoprazole much. Takes it every 2-3 days when think about   - about 1 month ago was cut down on lovastatin to 1/2 of 20mg.   - intermittent trouble sleeping. If drinks caffeine after 3pm, then can't sleep at night  - yesterday took long nap. Usually doesn't. Then last night couldn;t sleep    Assistance devises: none      Home environment: lives with  type: house    - stairs: yes in house. Has rails. One small step to enter home.   - grab bars: yes  - raised toilet seats: no  - shower: yes  - tub: yes  - throw rugs:  no  - clutter: no      Is dependent or requires assistance in the following BADL:  none    Is dependent or requires assistance in the following Instrumental ADL: none    Medications:  Total number of medications/day (OTC and prescribed) :5   Psychoactive medications? (antipsychotics, sedatives, hypnotic, anxiolytic, antidepressants) : no  Other concerning medications: no  Compliance: seldom misses meds     ETOH: no  Tobacco: no   Recreational drugs: no    Review of Systems:  No vision or hearing loss  - wears bifocals glasses  - has trouble sleeping  - occasional brief tinnitus  - no numbness or tingling in feet unless shoes too tight  - no fatigue  - no dizziness/lightheadedness  - No sob, cp  - no pain  -no foot problems  - no memory problems    Objective     Physical Exam  Supine: 138/81, HR 80; Standing 1 min: 133/86, HR 80; Standing 3 mins: 123/81, HR 80  Constitutional:  Well-nourished, kempt  Head: NC/AT  Eyes: PERRL, EOMI.   ENT:     Dentition: good    Oropharyx: clear    Neck: supple. trachea midline. Thyroid not enlarged  Lymphatics: No adenopathy at neck  Respiratory: clear without rales, rhonchi or wheezes  Cardiovascular: RRR, +S1, S2, no murmurs appreciated, JVD  physiologic    Extremities:  No clubbing, cyanosis.   Gastrointestinal: +BS, soft, nontender.  Genitourinary: deferred  Integumentary: No ulcers, pressure sores, rash  Musculoskeletal: slight kyphosis of spine    Contractures: none    Muscle bulk: nl    Digits/nails: onychomychosis of R 1st toenail. Slight changes on medial end of L 1st toenail    Effusions: none   Neurologic:    CNII-XII: nl    tone: nl     Tremor: none    Strength UE: nl      LE:  nl    F->N: nl b/l    fine finger movements: nl b/l    proprioception: normal    sensation feet to light touch: intact    Get up and go: slightly pushes to stand. Slightly reduced step length  Psychologic: affect full     FEAR of FALLING: N    Timed up and Go: __9 sec  An older adult who takes >= 12 sec to complete the TUG is at risk for falling.     5 time sit to stand: 10 sec    Had a little trouble standing on one leg             Component  Ref Range & Units 1 mo ago  (4/26/24) 1 yr ago  (4/7/23) 2 yr ago  (8/19/21) 5 yr ago  (7/5/18) 5 yr ago  (7/5/18) 5 yr ago  (7/5/18)   Glucose  74 - 99 mg/dL 84 92 88   92   Sodium  136 - 145 mmol/L 143 142 144   144   Potassium  3.5 - 5.3 mmol/L 4.2 4.4 4.5   4.3   Chloride  98 - 107 mmol/L 106 106 106   107   Bicarbonate  21 - 32 mmol/L 30 29 30   27   Anion Gap  10 - 20 mmol/L 11 11 13   14   Urea Nitrogen  6 - 23 mg/dL 21 21 18   19   Creatinine  0.50 - 1.05 mg/dL 1.35 High  1.25 High  1.26 High    1.06 High    eGFR  >60 mL/min/1.73m*2 39 Low         Calcium  8.6 - 10.6 mg/dL 9.6 10.0 9.9   9.8   Albumin  3.4 - 5.0 g/dL 4.1 4.1 4.4      Alkaline Phosphatase  33 - 136 U/L 80 80 75      Total Protein  6.4 - 8.2 g/dL 6.5 6.6 6.9      AST  9 - 39 U/L 17 14 18  18    Bilirubin, Total  0.0 - 1.2 mg/dL 0.4 0.5 0.7      ALT  7 - 45 U/L 13 13 CM 14 CM 17 CM       Component  Ref Range & Units 1 mo ago 1 yr ago 2 yr ago 5 yr ago   WBC  4.4 - 11.3 x10*3/uL 7.6 7.2 R 7.6 R 7.8 R   Hemoglobin  12.0 - 16.0 g/dL 12.7 13.0 13.4 13.2    Hematocrit  36.0 - 46.0 % 39.5 41.6 43.6 42.2   MCV  80 - 100 fL 91 91 94 91   RDW  11.5 - 14.5 % 13.2 13.2 13.4 13.5   Platelets  150 - 450 x10*3/uL 249 283 R 244 R 264 R               Component  Ref Range & Units 1 mo ago 1 yr ago 2 yr ago 4 yr ago 5 yr ago   Cholesterol  0 - 199 mg/dL 182 194   High   High  CM   HDL-Cholesterol  mg/dL 72.4 67.1 CM 66.3 CM 73.6 CM 61.3 CM   Cholesterol/HDL Ratio 2.5 2.9 CM 2.8 CM 2.9 CM 3.3 CM   LDL Calculated  <=99 mg/dL 96 110 High  R, CM 96 R,  High  R,  High  R, CM   VLDL  0 - 40 mg/dL 13 17 25 20 29   Triglycerides  0 - 149 mg/dL 67 86    CM   Non HDL Cholesterol  0 - 149 mg/dL 110           Lab Results   Component Value Date    TSH 3.68 08/19/2021       Lab Results   Component Value Date    VITD25 47 04/07/2023    VITD25 32 08/19/2021      CT kidneys  IMPRESSION:  1.  No definite renal stones or hydronephrosis  2. 9.6 mm hypodensity in the left lobe liver, which is too small to  characterize. This most likely is related to a cyst. Confirmation  with ultrasound may be obtained      DeXA 9/2021  AP Spine (L1-L4)   BMD: 0.926  T-score: -1.1  Z-score:  1.7  Classification:   Osteopenia   Femoral Neck (Left)        BMD: 0.903  T-score:  0.5  Z-score:  2.9  Classification: Normal  Total Hip (Left)           BMD: 0.975  T-score:  0.3  Z-score:  2.5  Classification: Normal  10-year Fracture Risk(1):  -----------------------------------------------------------------  Major Osteoporotic Fracture            11%  Hip Fracture                           3.8%  -----------------------------------------------------------------    Assessment/Plan   1. Recurrent falls  Had 3 falls towards the end of 2023. 1 happened while hiking and it was very hot and she bumped into a rock. Another was when she was standing on one foot trying to cut her toe nails and she fall. And 3rd was she tripped on steps while carrying a bag. Otherwise no falls since  then. No dizziness or lightheadedness. But has to wait before walking after standing. Did well on balance testing today, except for standing on one foot. She was given some exercises by PT to do at home. Advised avoiding wearing bifocals around the house and while using stairs.     2. Essential hypertension  Was recently started on amlodipine 2.5mg daily in may fater her BP was 140-150 systolic at pcp visit. Today her BP dropped 15 points between supine and standing. Also she hasn't yet taken the amlodipine today. At home, her bps are usually 120s-130s systolic before taking meds.   She was not on the amlodipine when she fell in 2023. But I am concerned that with it, she may be at increased risk for falling.   Recommend stopping the amlodipine and continuing to monitor bps at home    3. Osteopenia, unspecified location  Last DeXA was in 9/2021. Showed T of -1.1 in lumbar spine. Frax 10 year risk of hip fracture 3.8%. would recommend a repeat DeXA in 9/2024 or in 2025. Last D level in 2023 was in mid 40s. She takes calcium  + D daily. Recommend adding a D3 1000 units daily.     4. Gastroesophageal reflux disease, unspecified whether esophagitis present  - she has been taking the pantoprazole only 2-3x per week. No GERD symptoms. Long term use of PPIs can increase risk for fractures. Recommend stopping this  - pantoprazole (Protonix) 20 mg EC tablet; Take 1 tablet (20 mg) by mouth once daily as needed for heartburn. Do not crush, chew, or split.  Dispense: 30 tablet; Refill: 0    5. Onychomycosis  - noted on right 1st toe nail. Suggested using tea tree oil or vics vapor rub BID consistently for awhile.     6. hyperlipidemia  - she is concerned about being on the lovastatin. Been on it for long time. On it for primary prevention. Dose was decreased in past from 40mg to 20mg. And has been taking just 10mg for past month. Would recommend continuing on 10mg and check lipid panel in few months. If levels are ok, could  potentially reduce to 3x per week or even stop       Erum Billings MD

## 2024-06-19 NOTE — PROGRESS NOTES
Subjective   Patient ID: Meri Seth is a 84 y.o. female who presents for falls clinic.    HPI    Patient here due to recurrent falls, referred by her PCP.   She had falls near the end of 2023, no falls this year.   1) She fell in the bathroom. This was a very mild fall resulting in no injuries. Tripped in the bathroom and fell slightly forward.  2) She was hiking and fell forward on her face, broke her glasses.  3) She was walking up the steps at a friends house while carrying a heavy bag and fell backwards, bruising her coccyx. Denies dizziness before the falls. States the falls were each about 2-3 apart. She was not taking any new medications around the time of the falls.   Denies dizziness/vertigo. Sometimes she feels she is on the verge of feeling a little dizzy but it resolves. She hasn't been hiking as much lately. No real issues with balance, not as good as it used to be. She does not use a cane or a walker.   Denies headaches, blurry vision, and double vision.   Bright lights and loud sounds do not make the patient dizzy. Denies pressure induced dizziness. Denies autophony. Denies positional vertigo.      Denies hearing loss. She occasionally gets gets pulsatile tinnitus in the left ear that comes and goes and only lasts a few seconds when it happens. Does not happen daily. She had a hearing test last month through [facility], states her hearing was normal. Denies ear pain/drainage.   Denies previous ear surgery, loud noise exposure, and family history of hearing loss.      Denies diabetes. No numbness/tingling in the feet, except when shoes are too tight. Sometimes the right knee bothers her when walking. No neck or back issues.     Patient Active Problem List   Diagnosis    Stage 3a chronic kidney disease (Multi)    Hyperlipidemia    Recurrent falls    Gastroesophageal reflux disease    Encounter for screening mammogram for malignant neoplasm of breast    Essential hypertension     Rotator cuff tendinitis    Pain of right upper arm    Osteopenia    Onychomycosis    Obesity    Cough    Bee sting     History reviewed. No pertinent surgical history.    Review of Systems    All other systems have been reviewed and are negative for complaints except for those mentioned in history of present illness, past medical history and problem list.    Objective   Physical Exam    Constitutional: No fever, chills, weight loss or weight gain  General appearance: Appears well, well-nourished, well groomed. No acute distress.    Communication: Normal communication    Psychiatric: Oriented to person, place and time. Normal mood and affect.    Neurologic: Cranial nerves II-XII grossly intact and symmetric bilaterally.    Head and Face:  Head: Atraumatic with no masses, lesions or scarring.  Face: Normal symmetry. No scars or deformities.  TMJ: Normal, no trismus.    Eyes: Conjunctiva not edematous or erythematous. PERRLA    Right Ear: External inspection of ear with no deformity, scars, or masses. EAC is clear.  TM is intact with no sign of infection, effusion, or retraction.  No perforation seen. No perforation seen. No bruits auscultated preauricular or postauricular. No evidence of glomus tumor.     Left Ear: External inspection of ear with no deformity, scars, or masses. EAC is clear.  TM is intact with no sign of infection, effusion, or retraction.  No perforation seen. No bruits auscultated preauricular or postauricular. No evidence of glomus tumor.     Nose: External inspection of nose: No nasal lesions, lacerations or scars. Anterior rhinoscopy with limited visualization past the inferior turbinates. No tenderness on frontal or maxillary sinus palpation.    Oral Cavity/Mouth: Oral cavity and oropharynx mucosa moist and pink. No lesions or masses. Dentition normal. Tonsils appear normal. Uvula is midline. Tongue with no masses or lesions. Tongue with good mobility. The oropharynx is clear.    Neck: Normal  appearing, symmetric, trachea midline. No bruits auscultated.     Cardiovascular: Examination of peripheral vascular system shows no clubbing or cyanosis.    Respiratory: No respiratory distress increased work of breathing. Inspection of the chest with symmetric chest expansion and normal respiratory effort.    Skin: No head and neck rashes.    Lymph nodes: No adenopathy.     On vestibular exam, oculomotor function assessment shows normal ocular pursuits and saccades. There is no spontaneous nystagmus. Head Thrust test is negative bilaterally.  Postural testing performed. Romberg is negative for any obvious weakness/sway. Tandem Gait is negative for any obvious weakness/sway.     Reviewed recent PCP notes.     Assessment/Plan   Diagnoses and all orders for this visit:  Pulsatile tinnitus of left ear  Encouraged patient to have audiogram faxed over. Since her pulsatile tinnitus is so infrequent and with no other symptoms, we will monitor at this time. I instructed her to call the office if this changes or becomes more persistent and we should consider CT IAC to identify/rule out superior semicircular canal dehiscence, sigmoid sinus dehiscence/diverticulum and  CTA to identify/rule out aneurysm, AVM, glomus tumor, carotid artery stenosis, etc.    Recurrent falls    Patient was evaluated today by myself, Pharmacy, Geriatrics, and Physical Therapy and I collaborated with these providers regarding her condition. Based on an extensive vestibular exam, I do not feel that the patient has a peripheral vestibular disorder. Patient was thoroughly evaluated for BPPV, vestibular neuritis/labyrinthitis, Menieres Disease, and SCCD. The patient's plan was discussed with myself, pharmacy, geriatrics, and physical therapy. No further workup for ENT. See Dr. Billings's clinic note regarding further plan. No vestibular therapy needed at this time.     30 minutes was spent on this patient´s visit. More than 50% of that time was spent in  counseling regarding the possible etiologies, test results, treatment options and coordinating care.      All questions answered to patient satisfaction.        ALEC Hargrove-CNP 06/19/24 8:05 AM

## 2024-06-19 NOTE — PATIENT INSTRUCTIONS
Falls Clinic Recommendations  We have identified the following risks related to falls after your multidisciplinary evaluation and recommend the following interventions.    1. Medications:   - recommend stopping the pantoprazole (since you are taking infrequently already and no heart burn)             - recommend stopping the amlodipine     2. Foot wear:                   Please avoid walking barefoot or with socks only   Avoid flip flops, sandals, shoes with heels      3. Vision:    Avoid bifocals in your home and when using the stairs    4. Vitamin D:   Recommend vitamin D level with next blood draw   Vitamin D supplement:  recommend vitamin D3 25mcg (1000 units) 1 capsule daily     5.         Recommend repeat bone density scan this year or next year    6.        Do the physical therapy exercises

## 2024-06-19 NOTE — PROGRESS NOTES
Multidisciplinary Falls Clinic  Comprehensive Medication Review- Chart Review  Note- Clinical Pharmacy did not complete a face-to-face consultation with patient, in agreement with geriatrician and Falls Clinic team. Patient has not been billed for this encounter.     Meri Seth is a 84 y.o. female who was referred to the ambulatory Clinical Pharmacy Team for evaluation at the Falls Clinic.    Referring Provider: Dr. Whitten    Chronic Conditions of Note  Stage 3 CKD, HLD, HTN, GERD, osteopeneia    History of Falls  Chart Review  Has patient fallen? yes  December 2023- 3 falls:   Fell on step  Fell in bathroom  fell while hiking. On and off feels lightheaded.    Patient Interview  Did not feel dizzy, falls 2-3 weeks between  No vertigo  Feels balance is decent, no walker/cane  No headaches, vision issues, hearing loss    Current Medication/Environmental Allergies  Allergies   Allergen Reactions    Penicillin Swelling    Penicillins Rash and Swelling       Current Medications   Current Outpatient Medications on File Prior to Visit   Medication Sig Dispense Refill    amLODIPine (Norvasc) 2.5 mg tablet Take 1 tablet (2.5 mg) by mouth once daily. 30 tablet 5    CALCIUM CITRATE-VITAMIN D3 ORAL Take by mouth once daily.      ipratropium (Atrovent) 21 mcg (0.03 %) nasal spray Administer 2 sprays into each nostril every 12 hours. 30 mL 0    lovastatin (Mevacor) 20 mg tablet       pantoprazole (Protonix) 20 mg EC tablet Take 1 tablet (20 mg) by mouth once daily in the morning. Take before meals. Do not crush, chew, or split. 30 tablet 11     No current facility-administered medications on file prior to visit.     Medication List Analysis  Total number of medications/day (OTC and Rx):   Psychoactive medications (antipsychotics, sedatives, hypnotics, anxiolytics, ADTs):  None    Other medications that can contribute to fall risk:  Amlodipine 2.5 mg daily    Adverse  Medication interactions: None    Renal/Hepatic  "Dosing  Current GFR (reported): 39 mL/min/1.73 m2 as of 4/26/24  Current CrCl (calculated): 28.3 mL/min as of 4/26/24 labs  Are all medications dosed appropriately per current renal/hepatic function? Yes    Medication Efficacy  BG: No labs  Lipids: LDL 96 mg/dL and triglycerides 67 mg/dL as of 4/26/24  Bleed risk: No anticoagulant or antiplatelet therapies   Blood pressure:  Orthostatic hypotension? None per in-office testing     Position  Time  BP  Symptoms    Lying Down  5 min  /81   HR 80  None    Standing  1 min  /86   HR 80  None    Standing  3 min  /81   HR 80  None    Abnormal: drop in BP of >=20 mm Hg, or in diastolic BP of >=10 mm Hg, or lightheaded/dizzy       Laboratory Results  Lab Results   Component Value Date    BILITOT 0.4 04/26/2024    CALCIUM 9.6 04/26/2024    CO2 30 04/26/2024     04/26/2024    CREATININE 1.35 (H) 04/26/2024    GLUCOSE 84 04/26/2024    ALKPHOS 80 04/26/2024    K 4.2 04/26/2024    PROT 6.5 04/26/2024     04/26/2024    AST 17 04/26/2024    ALT 13 04/26/2024    BUN 21 04/26/2024    ANIONGAP 11 04/26/2024    ALBUMIN 4.1 04/26/2024    GFRF 43 (A) 04/07/2023    EGFR 39 (L) 04/26/2024     Lab Results   Component Value Date    TRIG 67 04/26/2024    CHOL 182 04/26/2024    LDLCALC 96 04/26/2024    HDL 72.4 04/26/2024     No results found for: \"HGBA1C\"    No results found for: \"DRHGPIPQ22\"   Lab Results   Component Value Date    VITD25 47 04/07/2023       ASCVD Risk  The ASCVD Risk score (Nieves WALLACE, et al., 2019) failed to calculate for the following reasons:    The 2019 ASCVD risk score is only valid for ages 40 to 79      Assessment/Plan   Problem List Items Addressed This Visit    None    General Patient Discussion and Findings  Amlodipine 2.5 mg daily just started as of 5/21/24- PCP concerned about elevated BP  Note- face-to-face visit with patient not completed; chart review performed instead. Patient has not been billed for this encounter. "     Comments/Recommendations to PCP  Continue all meds under the continuation of care with the referring provider and clinical pharmacy team  Consider need for amlodipine- may be helpful to have patient track home blood-pressure values     Falls Clinic Follow-up  Multidisciplinary based on patient's needs. Please see geriatric's note for final summary and multidisciplinary recommendations that were provided to the patient.     Lili Kang, PharmD     Verbal consent to manage patient's drug therapy was obtained from the patient. They were informed they may decline to participate or withdraw from participation in pharmacy services at any time.

## 2024-06-20 ENCOUNTER — APPOINTMENT (OUTPATIENT)
Dept: PRIMARY CARE | Facility: CLINIC | Age: 84
End: 2024-06-20
Payer: MEDICARE

## 2024-06-20 VITALS
DIASTOLIC BLOOD PRESSURE: 80 MMHG | HEIGHT: 63 IN | SYSTOLIC BLOOD PRESSURE: 130 MMHG | HEART RATE: 67 BPM | BODY MASS INDEX: 27.72 KG/M2 | OXYGEN SATURATION: 95 %

## 2024-06-20 DIAGNOSIS — I10 ESSENTIAL HYPERTENSION: Primary | ICD-10-CM

## 2024-06-20 DIAGNOSIS — N18.31 STAGE 3A CHRONIC KIDNEY DISEASE (MULTI): ICD-10-CM

## 2024-06-20 PROCEDURE — 1159F MED LIST DOCD IN RCRD: CPT | Performed by: INTERNAL MEDICINE

## 2024-06-20 PROCEDURE — 1157F ADVNC CARE PLAN IN RCRD: CPT | Performed by: INTERNAL MEDICINE

## 2024-06-20 PROCEDURE — 3075F SYST BP GE 130 - 139MM HG: CPT | Performed by: INTERNAL MEDICINE

## 2024-06-20 PROCEDURE — 1036F TOBACCO NON-USER: CPT | Performed by: INTERNAL MEDICINE

## 2024-06-20 PROCEDURE — 82043 UR ALBUMIN QUANTITATIVE: CPT

## 2024-06-20 PROCEDURE — 82570 ASSAY OF URINE CREATININE: CPT

## 2024-06-20 PROCEDURE — 1160F RVW MEDS BY RX/DR IN RCRD: CPT | Performed by: INTERNAL MEDICINE

## 2024-06-20 PROCEDURE — 3079F DIAST BP 80-89 MM HG: CPT | Performed by: INTERNAL MEDICINE

## 2024-06-20 PROCEDURE — 99213 OFFICE O/P EST LOW 20 MIN: CPT | Performed by: INTERNAL MEDICINE

## 2024-06-20 PROCEDURE — 1123F ACP DISCUSS/DSCN MKR DOCD: CPT | Performed by: INTERNAL MEDICINE

## 2024-06-20 ASSESSMENT — PATIENT HEALTH QUESTIONNAIRE - PHQ9
SUM OF ALL RESPONSES TO PHQ9 QUESTIONS 1 AND 2: 0
1. LITTLE INTEREST OR PLEASURE IN DOING THINGS: NOT AT ALL
2. FEELING DOWN, DEPRESSED OR HOPELESS: NOT AT ALL

## 2024-06-20 NOTE — PROGRESS NOTES
"Subjective   Patient ID: Meri Seth is a 84 y.o. female who presents for Follow-up (Another DrJuliane That was recommended told pt to stop the amlodipine, and pantoprazole.).    HPI   Seen for follow-up.  Has not taken amlodipine for 2 days.  Seen by geriatric specialist.  Recommended not to take amlodipine due to risk of falls  Protonix was also stopped  She does not have the cough like before  No heartburn  Completed CT chest  No recurrence of fall  Lovastatin dose changed to every other day  Review of Systems    Objective   /80   Pulse 67   Ht 1.588 m (5' 2.5\")   SpO2 95%   BMI 27.72 kg/m²     Physical Exam  Not in acute distress  Able to get up from chair without holding.  Neck supple no lymphadenopathy JVD or goiter  Chest is clear to auscultation  CVS: S1-S2 regular in rate and rhythm no murmur no edema  Assessment/Plan   Problem List Items Addressed This Visit             ICD-10-CM    Stage 3a chronic kidney disease (Multi) N18.31    Relevant Orders    Albumin , Urine Random    Essential hypertension - Primary I10        Will discontinue amlodipine.  She will monitor and call me if BP is over 1 40/90  Continue dietary modification.  Avoid processed foods and increase activity  Strengthening exercises discussed  Can stop pantoprazole .discussed CT findings  Follow-up for annual wellness and as needed  "

## 2024-06-21 LAB
CREAT UR-MCNC: 29.2 MG/DL (ref 20–320)
MICROALBUMIN UR-MCNC: <7 MG/L
MICROALBUMIN/CREAT UR: NORMAL MG/G{CREAT}

## 2025-05-07 ENCOUNTER — APPOINTMENT (OUTPATIENT)
Dept: PRIMARY CARE | Facility: CLINIC | Age: 85
End: 2025-05-07
Payer: MEDICARE

## 2025-05-07 VITALS
WEIGHT: 152.4 LBS | OXYGEN SATURATION: 94 % | BODY MASS INDEX: 27 KG/M2 | HEIGHT: 63 IN | HEART RATE: 75 BPM | DIASTOLIC BLOOD PRESSURE: 90 MMHG | SYSTOLIC BLOOD PRESSURE: 148 MMHG

## 2025-05-07 DIAGNOSIS — E78.2 MIXED HYPERLIPIDEMIA: ICD-10-CM

## 2025-05-07 DIAGNOSIS — K21.9 REFLUX LARYNGITIS: ICD-10-CM

## 2025-05-07 DIAGNOSIS — I10 ESSENTIAL HYPERTENSION: Primary | ICD-10-CM

## 2025-05-07 DIAGNOSIS — N18.32 CHRONIC KIDNEY DISEASE, STAGE 3B (MULTI): ICD-10-CM

## 2025-05-07 DIAGNOSIS — J04.0 REFLUX LARYNGITIS: ICD-10-CM

## 2025-05-07 PROCEDURE — 1036F TOBACCO NON-USER: CPT | Performed by: INTERNAL MEDICINE

## 2025-05-07 PROCEDURE — G2211 COMPLEX E/M VISIT ADD ON: HCPCS | Performed by: INTERNAL MEDICINE

## 2025-05-07 PROCEDURE — 1157F ADVNC CARE PLAN IN RCRD: CPT | Performed by: INTERNAL MEDICINE

## 2025-05-07 PROCEDURE — 1159F MED LIST DOCD IN RCRD: CPT | Performed by: INTERNAL MEDICINE

## 2025-05-07 PROCEDURE — 1158F ADVNC CARE PLAN TLK DOCD: CPT | Performed by: INTERNAL MEDICINE

## 2025-05-07 PROCEDURE — 3077F SYST BP >= 140 MM HG: CPT | Performed by: INTERNAL MEDICINE

## 2025-05-07 PROCEDURE — 3079F DIAST BP 80-89 MM HG: CPT | Performed by: INTERNAL MEDICINE

## 2025-05-07 PROCEDURE — 1123F ACP DISCUSS/DSCN MKR DOCD: CPT | Performed by: INTERNAL MEDICINE

## 2025-05-07 PROCEDURE — 99213 OFFICE O/P EST LOW 20 MIN: CPT | Performed by: INTERNAL MEDICINE

## 2025-05-07 PROCEDURE — 1160F RVW MEDS BY RX/DR IN RCRD: CPT | Performed by: INTERNAL MEDICINE

## 2025-05-07 RX ORDER — VALSARTAN 40 MG/1
40 TABLET ORAL DAILY
Qty: 30 TABLET | Refills: 1 | Status: SHIPPED | OUTPATIENT
Start: 2025-05-07 | End: 2025-07-06

## 2025-05-07 RX ORDER — FAMOTIDINE 40 MG/1
40 TABLET, FILM COATED ORAL NIGHTLY
Qty: 30 TABLET | Refills: 1 | Status: SHIPPED | OUTPATIENT
Start: 2025-05-07 | End: 2025-07-06

## 2025-05-07 ASSESSMENT — PATIENT HEALTH QUESTIONNAIRE - PHQ9
2. FEELING DOWN, DEPRESSED OR HOPELESS: NOT AT ALL
SUM OF ALL RESPONSES TO PHQ9 QUESTIONS 1 & 2: 0
1. LITTLE INTEREST OR PLEASURE IN DOING THINGS: NOT AT ALL

## 2025-05-07 NOTE — PROGRESS NOTES
"Subjective   Patient ID: Meri Seth is a 85 y.o. female who presents for Hoarseness.    HPI   No recurrence of fall.  Has not been taking any BP meds since geriatric specialist recommended not to take it  No chest pain headache or dizziness  Not checking BP at home  Has been having hoarseness again  No significant cough  Taking lovastatin 10  Review of Systems  No fever chills night sweats or weight loss  No rectal bleed  No anxiety or depression  Has varicose veins and some leg swelling  Objective   /90   Pulse 75   Ht 1.588 m (5' 2.5\")   Wt 69.1 kg (152 lb 6.4 oz)   SpO2 94%   BMI 27.43 kg/m²     Physical Exam  In NAD  No pallor or icterus  Neck supple.  No throat erythema  No lymphadenopathy or goiter  No JVD  Chest is clear  CVS: S1-S2 regular rate and rhythm  Abdomen soft nontender no mass  Extremities varicose veins and trace edema bilateral  Assessment/Plan   Problem List Items Addressed This Visit           ICD-10-CM    Hyperlipidemia E78.5    Relevant Orders    Alanine Aminotransferase    Aspartate Aminotransferase    Lipid Panel    CBC and Auto Differential    Essential hypertension - Primary I10    Relevant Medications    valsartan (Diovan) 40 mg tablet    Other Relevant Orders    Basic Metabolic Panel    CBC and Auto Differential    Chronic kidney disease, stage 3b (Multi) N18.32    Relevant Orders    Basic Metabolic Panel    Reflux laryngitis J04.0, K21.9    Relevant Medications    famotidine (Pepcid) 40 mg tablet     Blood pressure is remaining high.  Will give her lower dose of medication.  Fall prevention discussed  Monitor closely  Wear compression socks  Check renal function  Hoarseness likely from reflux.  Trial of Pepcid  Reassess in 3 to 4 weeks     "

## 2025-05-29 ENCOUNTER — APPOINTMENT (OUTPATIENT)
Dept: PRIMARY CARE | Facility: CLINIC | Age: 85
End: 2025-05-29
Payer: MEDICARE

## 2025-05-29 VITALS
OXYGEN SATURATION: 95 % | DIASTOLIC BLOOD PRESSURE: 81 MMHG | HEART RATE: 75 BPM | WEIGHT: 148.2 LBS | HEIGHT: 63 IN | SYSTOLIC BLOOD PRESSURE: 128 MMHG | BODY MASS INDEX: 26.26 KG/M2

## 2025-05-29 DIAGNOSIS — I10 ESSENTIAL HYPERTENSION: ICD-10-CM

## 2025-05-29 DIAGNOSIS — Z00.00 ROUTINE GENERAL MEDICAL EXAMINATION AT HEALTH CARE FACILITY: Primary | ICD-10-CM

## 2025-05-29 DIAGNOSIS — N18.32 CHRONIC KIDNEY DISEASE, STAGE 3B (MULTI): ICD-10-CM

## 2025-05-29 ASSESSMENT — ACTIVITIES OF DAILY LIVING (ADL)
GROCERY_SHOPPING: INDEPENDENT
BATHING: INDEPENDENT
DRESSING: INDEPENDENT
DOING_HOUSEWORK: INDEPENDENT
TAKING_MEDICATION: INDEPENDENT
MANAGING_FINANCES: INDEPENDENT

## 2025-05-29 NOTE — PROGRESS NOTES
"Subjective   Reason for Visit: Meri Seth is an 85 y.o. female here for a Medicare Wellness visit.     Past Medical, Surgical, and Family History reviewed and updated in chart.    Reviewed all medications by prescribing practitioner or clinical pharmacist (such as prescriptions, OTCs, herbal therapies and supplements) and documented in the medical record.    HPI  Tolerating Diovan.  Occasional feels transient dizziness  Hoarseness better with famotidine  Leg edema still there  No chest pain shortness of breath or palpitation  Not completed lab work  Still taking statin  Fell when chair with wheels rolled while trying to sit.  Hit buttocks.  No injury  Patient Care Team:  Sneha Whitten MD as PCP - General (Internal Medicine)  Sneha Whitten MD as PCP - [employer] PCP     Review of Systems  No fever chills night sweats or weight loss  Gets anxious easily  Sometimes feels body shaking with no visible shakes  Objective   Vitals:  /81   Pulse 75   Ht 1.588 m (5' 2.5\")   Wt 67.2 kg (148 lb 3.2 oz)   SpO2 95%   BMI 26.67 kg/m²       Physical Exam  In NAD  No pallor or icterus  Neck supple.  No throat erythema  No lymphadenopathy or goiter  No JVD  Chest is clear  CVS: S1-S2 regular rate and rhythm  Abdomen soft nontender no mass  Extremities varicose veins and trace edema bilateral  Assessment & Plan  Routine general medical examination at health care facility  Wellness exam and counseling completed  Has good functional status  Continue strength training exercises  Son is power of   Get Shingrix vaccine and RSV  Orders:    1 Year Follow Up In Primary Care - Wellness Exam; Future    Chronic kidney disease, stage 3b (Multi)  Check renal function       Essential hypertension  Blood pressure is well-controlled.  If dizziness gets any worse or persist can stop BP medication.  Monitor BP and call me with home numbers next week.  If numbers are somewhat low will stop medication        "   Wear compression socks.  Elevate legs  Improve hydration  Fall prevention discussed.  Try to avoid chair with wheels  especially since she has hardwood floors  Follow-up in 6 months and as needed     Patient was identified as a fall risk. Risk prevention instructions provided.

## 2025-05-29 NOTE — ASSESSMENT & PLAN NOTE
Blood pressure is well-controlled.  If dizziness gets any worse or persist can stop BP medication.  Monitor BP and call me with home numbers next week.  If numbers are somewhat low will stop medication

## 2025-05-29 NOTE — PATIENT INSTRUCTIONS

## 2025-06-04 ENCOUNTER — TELEPHONE (OUTPATIENT)
Dept: PRIMARY CARE | Facility: CLINIC | Age: 85
End: 2025-06-04
Payer: MEDICARE

## 2025-06-04 LAB
ALT SERPL-CCNC: 9 U/L (ref 6–29)
ANION GAP SERPL CALCULATED.4IONS-SCNC: 12 MMOL/L (CALC) (ref 7–17)
AST SERPL-CCNC: 16 U/L (ref 10–35)
BASOPHILS # BLD AUTO: 58 CELLS/UL (ref 0–200)
BASOPHILS NFR BLD AUTO: 0.8 %
BUN SERPL-MCNC: 30 MG/DL (ref 7–25)
BUN/CREAT SERPL: 18 (CALC) (ref 6–22)
CALCIUM SERPL-MCNC: 9.9 MG/DL (ref 8.6–10.4)
CHLORIDE SERPL-SCNC: 106 MMOL/L (ref 98–110)
CHOLEST SERPL-MCNC: 240 MG/DL
CHOLEST/HDLC SERPL: 3.2 (CALC)
CO2 SERPL-SCNC: 24 MMOL/L (ref 20–32)
CREAT SERPL-MCNC: 1.7 MG/DL (ref 0.6–0.95)
EGFRCR SERPLBLD CKD-EPI 2021: 29 ML/MIN/1.73M2
EOSINOPHIL # BLD AUTO: 212 CELLS/UL (ref 15–500)
EOSINOPHIL NFR BLD AUTO: 2.9 %
ERYTHROCYTE [DISTWIDTH] IN BLOOD BY AUTOMATED COUNT: 13.3 % (ref 11–15)
GLUCOSE SERPL-MCNC: 90 MG/DL (ref 65–99)
HCT VFR BLD AUTO: 41.7 % (ref 35–45)
HDLC SERPL-MCNC: 74 MG/DL
HGB BLD-MCNC: 13.2 G/DL (ref 11.7–15.5)
LDLC SERPL CALC-MCNC: 143 MG/DL (CALC)
LYMPHOCYTES # BLD AUTO: 1599 CELLS/UL (ref 850–3900)
LYMPHOCYTES NFR BLD AUTO: 21.9 %
MCH RBC QN AUTO: 29.6 PG (ref 27–33)
MCHC RBC AUTO-ENTMCNC: 31.7 G/DL (ref 32–36)
MCV RBC AUTO: 93.5 FL (ref 80–100)
MONOCYTES # BLD AUTO: 672 CELLS/UL (ref 200–950)
MONOCYTES NFR BLD AUTO: 9.2 %
NEUTROPHILS # BLD AUTO: 4760 CELLS/UL (ref 1500–7800)
NEUTROPHILS NFR BLD AUTO: 65.2 %
NONHDLC SERPL-MCNC: 166 MG/DL (CALC)
PLATELET # BLD AUTO: 265 THOUSAND/UL (ref 140–400)
PMV BLD REES-ECKER: 10.1 FL (ref 7.5–12.5)
POTASSIUM SERPL-SCNC: 4.9 MMOL/L (ref 3.5–5.3)
RBC # BLD AUTO: 4.46 MILLION/UL (ref 3.8–5.1)
SODIUM SERPL-SCNC: 142 MMOL/L (ref 135–146)
TRIGL SERPL-MCNC: 116 MG/DL
WBC # BLD AUTO: 7.3 THOUSAND/UL (ref 3.8–10.8)

## 2025-06-04 NOTE — TELEPHONE ENCOUNTER
----- Message from Sneha Whitten sent at 6/4/2025 11:39 AM EDT -----  Advise kidney function slightly worse than before.  Ask her to stop the valsartan completely.  Make sure to drink 6 to 8 cups of water daily.  Cholesterol slightly higher than before.  No change in   medication for that  ----- Message -----  From: IDx Results In  Sent: 6/4/2025   6:07 AM EDT  To: Sneha Whitten MD

## 2025-06-26 DIAGNOSIS — K21.9 REFLUX LARYNGITIS: ICD-10-CM

## 2025-06-26 DIAGNOSIS — J04.0 REFLUX LARYNGITIS: ICD-10-CM

## 2025-06-26 RX ORDER — FAMOTIDINE 40 MG/1
40 TABLET, FILM COATED ORAL NIGHTLY
Qty: 90 TABLET | Refills: 1 | Status: SHIPPED | OUTPATIENT
Start: 2025-06-26 | End: 2025-08-25

## 2025-08-12 ENCOUNTER — APPOINTMENT (OUTPATIENT)
Dept: PRIMARY CARE | Facility: CLINIC | Age: 85
End: 2025-08-12
Payer: MEDICARE

## 2025-08-13 ENCOUNTER — OFFICE VISIT (OUTPATIENT)
Dept: PRIMARY CARE | Facility: CLINIC | Age: 85
End: 2025-08-13
Payer: MEDICARE

## 2025-08-13 VITALS
OXYGEN SATURATION: 94 % | SYSTOLIC BLOOD PRESSURE: 148 MMHG | HEART RATE: 75 BPM | HEIGHT: 63 IN | BODY MASS INDEX: 25.92 KG/M2 | DIASTOLIC BLOOD PRESSURE: 88 MMHG | WEIGHT: 146.3 LBS

## 2025-08-13 DIAGNOSIS — E55.9 VITAMIN D INSUFFICIENCY: ICD-10-CM

## 2025-08-13 DIAGNOSIS — N18.32 CHRONIC KIDNEY DISEASE, STAGE 3B (MULTI): ICD-10-CM

## 2025-08-13 DIAGNOSIS — I10 ESSENTIAL HYPERTENSION: ICD-10-CM

## 2025-08-13 DIAGNOSIS — R29.6 RECURRENT FALLS: ICD-10-CM

## 2025-08-13 DIAGNOSIS — E78.2 MIXED HYPERLIPIDEMIA: ICD-10-CM
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
## 2025-08-13 DIAGNOSIS — M62.81 MUSCLE WEAKNESS: ICD-10-CM

## 2025-08-13 DIAGNOSIS — R25.1 TREMOR: ICD-10-CM

## 2025-08-13 PROCEDURE — 3079F DIAST BP 80-89 MM HG: CPT | Performed by: INTERNAL MEDICINE

## 2025-08-13 PROCEDURE — 1160F RVW MEDS BY RX/DR IN RCRD: CPT | Performed by: INTERNAL MEDICINE

## 2025-08-13 PROCEDURE — 3077F SYST BP >= 140 MM HG: CPT | Performed by: INTERNAL MEDICINE

## 2025-08-13 PROCEDURE — 1159F MED LIST DOCD IN RCRD: CPT | Performed by: INTERNAL MEDICINE

## 2025-08-13 PROCEDURE — 1036F TOBACCO NON-USER: CPT | Performed by: INTERNAL MEDICINE

## 2025-08-13 PROCEDURE — 99214 OFFICE O/P EST MOD 30 MIN: CPT | Performed by: INTERNAL MEDICINE

## 2025-08-14 LAB
25(OH)D3+25(OH)D2 SERPL-MCNC: 37 NG/ML (ref 30–100)
ANION GAP SERPL CALCULATED.4IONS-SCNC: 8 MMOL/L (CALC) (ref 7–17)
BUN SERPL-MCNC: 23 MG/DL (ref 7–25)
BUN/CREAT SERPL: 14 (CALC) (ref 6–22)
CALCIUM SERPL-MCNC: 10.2 MG/DL (ref 8.6–10.4)
CHLORIDE SERPL-SCNC: 103 MMOL/L (ref 98–110)
CK SERPL-CCNC: 54 U/L (ref 16–215)
CO2 SERPL-SCNC: 29 MMOL/L (ref 20–32)
CREAT SERPL-MCNC: 1.6 MG/DL (ref 0.6–0.95)
CRP SERPL-MCNC: <3 MG/L
EGFRCR SERPLBLD CKD-EPI 2021: 31 ML/MIN/1.73M2
GLUCOSE SERPL-MCNC: 111 MG/DL (ref 65–99)
POTASSIUM SERPL-SCNC: 4.6 MMOL/L (ref 3.5–5.3)
SODIUM SERPL-SCNC: 140 MMOL/L (ref 135–146)
TSH SERPL-ACNC: 2.74 MIU/L (ref 0.4–4.5)

## 2025-08-27 ENCOUNTER — APPOINTMENT (OUTPATIENT)
Dept: PRIMARY CARE | Facility: CLINIC | Age: 85
End: 2025-08-27
Payer: MEDICARE

## 2025-08-27 VITALS
TEMPERATURE: 97.6 F | DIASTOLIC BLOOD PRESSURE: 85 MMHG | WEIGHT: 144 LBS | BODY MASS INDEX: 25.52 KG/M2 | SYSTOLIC BLOOD PRESSURE: 153 MMHG | HEIGHT: 63 IN | OXYGEN SATURATION: 97 % | HEART RATE: 78 BPM

## 2025-08-27 DIAGNOSIS — N18.32 STAGE 3B CHRONIC KIDNEY DISEASE (MULTI): ICD-10-CM

## 2025-08-27 DIAGNOSIS — R26.81 GAIT INSTABILITY: Primary | ICD-10-CM

## 2025-08-27 LAB — PROT SERPL-MCNC: 7.3 G/DL (ref 6.4–8.2)

## 2025-08-27 PROCEDURE — 1159F MED LIST DOCD IN RCRD: CPT | Performed by: FAMILY MEDICINE

## 2025-08-27 PROCEDURE — 3079F DIAST BP 80-89 MM HG: CPT | Performed by: FAMILY MEDICINE

## 2025-08-27 PROCEDURE — 99214 OFFICE O/P EST MOD 30 MIN: CPT | Performed by: FAMILY MEDICINE

## 2025-08-27 PROCEDURE — G2211 COMPLEX E/M VISIT ADD ON: HCPCS | Performed by: FAMILY MEDICINE

## 2025-08-27 PROCEDURE — 1160F RVW MEDS BY RX/DR IN RCRD: CPT | Performed by: FAMILY MEDICINE

## 2025-08-27 PROCEDURE — 3077F SYST BP >= 140 MM HG: CPT | Performed by: FAMILY MEDICINE

## 2025-08-27 RX ORDER — LISINOPRIL 5 MG/1
5 TABLET ORAL DAILY
Qty: 90 TABLET | Refills: 0 | Status: SHIPPED | OUTPATIENT
Start: 2025-08-27 | End: 2026-10-01

## 2025-08-28 LAB
APPEARANCE UR: CLEAR
BACTERIA #/AREA URNS HPF: ABNORMAL /HPF
BILIRUB UR QL STRIP: NEGATIVE
COLOR UR: YELLOW
GLUCOSE UR QL STRIP: NEGATIVE
HGB UR QL STRIP: NEGATIVE
HYALINE CASTS #/AREA URNS LPF: ABNORMAL /LPF
KETONES UR QL STRIP: NEGATIVE
LEUKOCYTE ESTERASE UR QL STRIP: ABNORMAL
NITRITE UR QL STRIP: NEGATIVE
PH UR STRIP: 6 [PH] (ref 5–8)
PHOSPHATE SERPL-MCNC: 3.9 MG/DL (ref 2.1–4.3)
PROT UR QL STRIP: NEGATIVE
PTH-INTACT SERPL-MCNC: 24 PG/ML (ref 16–77)
RBC #/AREA URNS HPF: ABNORMAL /HPF
SERVICE CMNT-IMP: ABNORMAL
SP GR UR STRIP: 1.01 (ref 1–1.03)
SQUAMOUS #/AREA URNS HPF: ABNORMAL /HPF
WBC #/AREA URNS HPF: ABNORMAL /HPF

## 2025-09-01 LAB
ALBUMIN: 4.6 G/DL (ref 3.4–5)
ALPHA 1 GLOBULIN: 0.3 G/DL (ref 0.2–0.6)
ALPHA 2 GLOBULIN: 0.7 G/DL (ref 0.4–1.1)
BETA GLOBULIN: 0.7 G/DL (ref 0.5–1.2)
GAMMA GLOBULIN: 0.9 G/DL (ref 0.5–1.4)
PATH REVIEW-SERUM PROTEIN ELECTROPHORESIS: NORMAL
PROTEIN ELECTROPHORESIS COMMENT: NORMAL

## 2025-09-12 ENCOUNTER — APPOINTMENT (OUTPATIENT)
Dept: PRIMARY CARE | Facility: CLINIC | Age: 85
End: 2025-09-12
Payer: MEDICARE

## 2025-11-21 ENCOUNTER — APPOINTMENT (OUTPATIENT)
Dept: PRIMARY CARE | Facility: CLINIC | Age: 85
End: 2025-11-21
Payer: MEDICARE